# Patient Record
Sex: FEMALE
[De-identification: names, ages, dates, MRNs, and addresses within clinical notes are randomized per-mention and may not be internally consistent; named-entity substitution may affect disease eponyms.]

---

## 2017-05-19 ENCOUNTER — HOSPITAL ENCOUNTER (INPATIENT)
Dept: HOSPITAL 31 - C.ER | Age: 37
LOS: 1 days | Discharge: HOME | DRG: 361 | End: 2017-05-20
Attending: OBSTETRICS & GYNECOLOGY | Admitting: OBSTETRICS & GYNECOLOGY
Payer: COMMERCIAL

## 2017-05-19 DIAGNOSIS — Z90.710: ICD-10-CM

## 2017-05-19 DIAGNOSIS — Z90.721: ICD-10-CM

## 2017-05-19 DIAGNOSIS — N73.6: Primary | ICD-10-CM

## 2017-05-19 LAB
ALBUMIN/GLOB SERPL: 1.2 {RATIO} (ref 1–2.1)
ALP SERPL-CCNC: 64 U/L (ref 38–126)
ALT SERPL-CCNC: 28 U/L (ref 9–52)
APTT BLD: 32 SECONDS (ref 21–34)
AST SERPL-CCNC: 24 U/L (ref 14–36)
BACTERIA #/AREA URNS HPF: (no result) /[HPF]
BASE EXCESS BLDV CALC-SCNC: -4.6 MMOL/L (ref 0–2)
BASOPHILS # BLD AUTO: 0.1 K/UL (ref 0–0.2)
BASOPHILS NFR BLD: 0.6 % (ref 0–2)
BILIRUB SERPL-MCNC: 0.7 MG/DL (ref 0.2–1.3)
BILIRUB UR-MCNC: NEGATIVE MG/DL
BUN SERPL-MCNC: 8 MG/DL (ref 7–17)
CALCIUM SERPL-MCNC: 9 MG/DL (ref 8.6–10.4)
CHLORIDE SERPL-SCNC: 102 MMOL/L (ref 98–107)
CO2 SERPL-SCNC: 24 MMOL/L (ref 22–30)
EOSINOPHIL # BLD AUTO: 0.1 K/UL (ref 0–0.7)
EOSINOPHIL NFR BLD: 1.1 % (ref 0–4)
ERYTHROCYTE [DISTWIDTH] IN BLOOD BY AUTOMATED COUNT: 13.6 % (ref 11.5–14.5)
GLOBULIN SER-MCNC: 3.7 GM/DL (ref 2.2–3.9)
GLUCOSE SERPL-MCNC: 87 MG/DL (ref 65–105)
GLUCOSE UR STRIP-MCNC: NORMAL MG/DL
HCT VFR BLD CALC: 39.7 % (ref 34–47)
INR PPP: 1.1
KETONES UR STRIP-MCNC: NEGATIVE MG/DL
LEUKOCYTE ESTERASE UR-ACNC: (no result) LEU/UL
LYMPHOCYTES # BLD AUTO: 3.8 K/UL (ref 1–4.3)
LYMPHOCYTES NFR BLD AUTO: 29.9 % (ref 20–40)
MCH RBC QN AUTO: 29.5 PG (ref 27–31)
MCHC RBC AUTO-ENTMCNC: 33 G/DL (ref 33–37)
MCV RBC AUTO: 89.3 FL (ref 81–99)
MONOCYTES # BLD: 0.9 K/UL (ref 0–0.8)
MONOCYTES NFR BLD: 7.2 % (ref 0–10)
NRBC BLD AUTO-RTO: 0 % (ref 0–2)
PCO2 BLDV: 46 MMHG (ref 40–60)
PH BLDV: 7.29 [PH] (ref 7.32–7.43)
PH UR STRIP: 6 [PH] (ref 5–8)
PLATELET # BLD: 357 K/UL (ref 130–400)
PMV BLD AUTO: 8 FL (ref 7.2–11.7)
POTASSIUM SERPL-SCNC: 3.9 MMOL/L (ref 3.6–5.2)
PROT SERPL-MCNC: 8 G/DL (ref 6.3–8.3)
PROT UR STRIP-MCNC: NEGATIVE MG/DL
RBC # UR STRIP: NEGATIVE /UL
RBC #/AREA URNS HPF: < 1 /HPF (ref 0–3)
SODIUM SERPL-SCNC: 137 MMOL/L (ref 132–148)
SP GR UR STRIP: 1 (ref 1–1.03)
UROBILINOGEN UR-MCNC: NORMAL MG/DL (ref 0.2–1)
WBC # BLD AUTO: 12.7 K/UL (ref 4.8–10.8)
WBC #/AREA URNS HPF: 1 /HPF (ref 0–5)

## 2017-05-19 NOTE — US
EXAM:

  US Pelvis, Transvaginal



CLINICAL HISTORY:

  36 years old, female; Pain; Pelvic pain; Additional info: Left adenxal pain



TECHNIQUE:

  Real-time transvaginal pelvic ultrasound (complete) with image documentation. 

 Transvaginal imaging was used for better evaluation of the endometrium and 

adnexa.



EXAM DATE/TIME:

  5/19/2017 10:26 PM



COMPARISON:

  CT - ABD   PELVIS W/O PO OR IV CONT 5/19/2017 10:10:21 PM



FINDINGS:

  

Uterus: Uterus is surgically absent.



Right ovary: Right ovary measures approximately 21 2 x 1.4 x 1.9 cm.  There are 

small follicles.  There is flow in the right ovary.



Left ovary: Left ovary is enlarged, 6.35 x 4.28 x 5.93 cm.  There is a complex 

mass in the left ovary.  Mass measures approximately 5.2 x 3.9 x 5.3 cm.  There 

is a fluid component to the mass.  There is a larger avascular solid-appearing 

component.  There is flow in the periphery of the left ovary.



IMPRESSION: Enlarged left ovary with complex mass possibly hemorrhagic cyst 

with adjacent simple cyst, no torsion



Followup suggested to document resolution

## 2017-05-20 VITALS — HEART RATE: 69 BPM | SYSTOLIC BLOOD PRESSURE: 104 MMHG | RESPIRATION RATE: 20 BRPM | DIASTOLIC BLOOD PRESSURE: 69 MMHG

## 2017-05-20 VITALS — TEMPERATURE: 97.7 F | OXYGEN SATURATION: 96 %

## 2017-05-20 PROCEDURE — 0WJJ4ZZ INSPECTION OF PELVIC CAVITY, PERCUTANEOUS ENDOSCOPIC APPROACH: ICD-10-PCS | Performed by: OBSTETRICS & GYNECOLOGY

## 2017-05-20 NOTE — CP.SDSHP
Same Day Surgery H & P





- History


Proposed Procedure: Diagnostic Laparascopy with possible ovarian cystectomy or 

oophorectomy or open laparatomy


Pre-Op Diagnosis: Left Lower Quadrant pain.  Left Ovarian Torsion





- Allergies


Allergies: 


Allergies





No Known Allergies Allergy (Verified 05/19/17 21:41)


 











- Physical Exam


Vital Signs: 


 Vital Signs











  05/19/17 05/19/17 05/19/17





  21:37 22:35 23:45


 


Temperature 97.5 F L  98.0 F


 


Pulse Rate 80 80 81


 


Respiratory 20 18 16





Rate   


 


Blood Pressure 146/87 123/72 122/80


 


O2 Sat by Pulse 95 96 98





Oximetry   














  05/19/17





  23:47


 


Temperature 


 


Pulse Rate 


 


Respiratory 





Rate 


 


Blood Pressure 


 


O2 Sat by Pulse 95





Oximetry 











Mental Status: Alert & Oriented x3


Neuro: WNL


Heart: WNL


Lungs: WNL


GI: WNL





- {Optional Preform as Required}


Breast: WNL


Abdomen: Other (Tenderness with some amount of guarding on the left flank.)


GYN: Other (Enlarged left adnexal mass)


: WNL


ENT: WNL


Other Pertinent Findings: Enlarged left Adnexal mass





- Impression


Impression: Left Ovarian Torsion


Pt. Evaluated Today:Candidate for Anesthesia & Procedure: Yes





- Date & Time


Date: 05/20/17


Time: 00:23





Short Stay Discharge





- Short Stay Discharge


Admitting Diagnosis/Reason for Visit: ABDOMINAL PAIN


Disposition: HOSPITALIZED

## 2017-05-20 NOTE — PCM.SURG1
Surgeon's Initial Post Op Note





- Surgeon's Notes


Surgeon: Dr Sanz


Assistant: Dr Talley


Type of Anesthesia: General Endo


Anesthesia Administered By: Dr Hicks


Pre-Operative Diagnosis: Abdominal Pain.  Left Ovarian torsion


Operative Findings: Extensive intrabdominal adhesions involving the omentun and 

bowels to the anterior abdominal wall and to the pelvis. There was no evidence 

of ovarian torsion. The left ovary involved with adhesions to the omentum and 

the bowels. The bowels appears normal. The uterus was absent. Rt ovary not 

visualized.  IV Fluid intake.- 600mls.  Urine Output- 50mls.  EBL - 10mls


Post-Operative Diagnosis: Pelvic adhesions


Operation Performed: Diagnostic Laparascopy


Specimen/Specimens Removed: None


Estimated Blood Loss: EBL {In ML}: 10


Blood Products Given: N/A


Post-Op Condition: Good


Date of Surgery/Procedure: 05/20/17


Time of Surgery/Procedure: 03:01

## 2017-05-20 NOTE — CT
PROCEDURE:  CT Abdomen and Pelvis without intravenous contrast



HISTORY:

Abdominal pain 



COMPARISON:

None.



TECHNIQUE:

Axial computed tomographic images were performed through the abdomen 

and pelvis without the use of intravenous contrast.  Subsequently, 

sagittal and coronal reformatted images were obtained. 



Radiation dose:



Total exam DLP = 341 mGy-cm.



This CT exam was performed using one or more of the following dose 

reduction techniques: Automated exposure control, adjustment of the 

mA and/or kV according to patient size, and/or use of iterative 

reconstruction technique.



FINDINGS:



LOWER THORAX:

Unremarkable. 



LIVER:

Unremarkable. No gross lesion or ductal dilatation.  



GALLBLADDER AND BILE DUCTS:

Unremarkable. 



PANCREAS:

Unremarkable. No gross lesion or ductal dilatation.



SPLEEN:

Unremarkable. 



ADRENALS:

Unremarkable. No mass. 



KIDNEYS AND URETERS:

Moderate left hydroureteronephrosis secondary to a left adnexal 

cystic mass which measures 5.4 centimeters.  Moderate left 

hydroureter seen. 



VASCULATURE:

Unremarkable. No aortic aneurysm. 



BOWEL:

Unremarkable. No obstruction. No gross mural thickening. 



APPENDIX:

Unremarkable. Normal appendix. 



PERITONEUM:

Unremarkable. No free fluid. No free air. 



LYMPH NODES:

Unremarkable. No enlarged lymph nodes. 



BLADDER:

Decompressed urinary bladder wall. 



REPRODUCTIVE:

Retroverted uterus. Complex mass at the level of the left adnexa, 

possibly related to the left ovary measuring 5.4 x 4.1 x 6.1 

centimeters. 



BONES:

No acute fracture. 



OTHER FINDINGS:

None.



IMPRESSION:

Moderate left hydronephrosis and hydroureter secondary to a left 

adnexal mass which appears to cause mass effect on the distal left 

ureter.



Differential diagnosis of the left adnexal mass may include a 

hemorrhagic cyst versus endometrioma versus ovarian torsion versus 

neoplasm versus additional etiology.  Clinical correlation. Consider 

transvaginal sonographic correlation with Doppler. 



These findings were preliminarily reported at 10:23 p.m. on 

05/19/2017 by Dr. Curt Bhakta .

## 2017-05-20 NOTE — RAD
Chest x-ray single frontal view 



History: Preoperative evaluation. 



Comparison: 04/24/2017 



Findings: 



Persistent ill-defined areas of increased radiodensity seen within 

the right upper to mid lung zone as well as the left mid lung which 

may represent chronic scarring. Additional etiologies not excluded. 

This is not significantly changed since the prior study. 



Mild venous congestion. 



Heart size within normal limits. 



Impression: 



No significant interval change.

## 2017-05-20 NOTE — CP.PCM.DIS
Provider





- Provider


Date of Admission: 


05/19/17 23:47





Attending physician: 


Pop Sanz





Time Spent in preparation of Discharge (in minutes): 15





Diagnosis





- Discharge Diagnosis


(1) Left lower quadrant pain


Status: Acute   Priority: Low   Onset Date: ~05/19/17   


Comment: Status post diagnostic laparoscopy. Significant abdominal adhesions 

noted. No evidence of ovarian torsion. No other additional procedures performed

   





(2) Pelvic adhesive disease


Status: Chronic   Priority: Medium   Onset Date: ~05/19/17   


Comment: Status post diagnostic laparoscopy: significant for abdominal 

adhesions. Right ovary not seen. Left ovary covered with adhesions. No 

additional procedure(s) performed.   





Hospital Course





- Lab Results


Lab Results: 


 Most Recent Lab Values











WBC  12.7 K/uL (4.8-10.8)  H  05/19/17  21:54    


 


RBC  4.44 Mil/uL (3.80-5.20)   05/19/17  21:54    


 


Hgb  13.1 g/dL (11.0-16.0)   05/19/17  21:54    


 


Hct  39.7 % (34.0-47.0)   05/19/17  21:54    


 


MCV  89.3 fL (81.0-99.0)   05/19/17  21:54    


 


MCH  29.5 pg (27.0-31.0)   05/19/17  21:54    


 


MCHC  33.0 g/dL (33.0-37.0)   05/19/17  21:54    


 


RDW  13.6 % (11.5-14.5)   05/19/17  21:54    


 


Plt Count  357 K/uL (130-400)   05/19/17  21:54    


 


MPV  8.0 fL (7.2-11.7)   05/19/17  21:54    


 


Neut % (Auto)  61.2 % (50.0-75.0)   05/19/17  21:54    


 


Lymph % (Auto)  29.9 % (20.0-40.0)   05/19/17  21:54    


 


Mono % (Auto)  7.2 % (0.0-10.0)   05/19/17  21:54    


 


Eos % (Auto)  1.1 % (0.0-4.0)   05/19/17  21:54    


 


Baso % (Auto)  0.6 % (0.0-2.0)   05/19/17  21:54    


 


Neut #  7.8 K/uL (1.8-7.0)  H  05/19/17  21:54    


 


Lymph #  3.8 K/uL (1.0-4.3)   05/19/17  21:54    


 


Mono #  0.9 K/uL (0.0-0.8)  H  05/19/17  21:54    


 


Eos #  0.1 K/uL (0.0-0.7)   05/19/17  21:54    


 


Baso #  0.1 K/uL (0.0-0.2)   05/19/17  21:54    


 


PT  12.0 SECONDS (9.7-12.2)   05/19/17  21:54    


 


INR  1.1   05/19/17  21:54    


 


APTT  32 SECONDS (21-34)   05/19/17  21:54    


 


pO2  40 mm/Hg (30-55)   05/19/17  23:48    


 


VBG pH  7.29  (7.32-7.43)  L  05/19/17  23:48    


 


VBG pCO2  46 mmHg (40-60)   05/19/17  23:48    


 


VBG HCO3  20.5 mmol/L  05/19/17  23:48    


 


VBG Total CO2  23.5 mmol/L (22-28)   05/19/17  23:48    


 


VBG O2 Sat (Calc)  77.3 % (40-65)  H  05/19/17  23:48    


 


VBG Base Excess  -4.6 mmol/L (0.0-2.0)  L  05/19/17  23:48    


 


VBG Potassium  5.5 mmol/L (3.6-5.2)  H  05/19/17  23:48    


 


Sodium  139.0 mmol/l (132-148)   05/19/17  23:48    


 


Chloride  110.0 mmol/L ()  H  05/19/17  23:48    


 


Glucose  82 mg/dl ()   05/19/17  23:48    


 


Lactate  1.0 mmol/L (0.7-2.1)   05/19/17  23:48    


 


Sodium  137 mmol/L (132-148)   05/19/17  21:54    


 


Potassium  3.9 mmol/L (3.6-5.2)   05/19/17  21:54    


 


Chloride  102 mmol/L ()   05/19/17  21:54    


 


Carbon Dioxide  24 mmol/L (22-30)   05/19/17  21:54    


 


Anion Gap  16  (10-20)   05/19/17  21:54    


 


BUN  8 mg/dL (7-17)   05/19/17  21:54    


 


Creatinine  0.7 MG/DL (0.7-1.2)   05/19/17  21:54    


 


Est GFR ( Amer)  > 60   05/19/17  21:54    


 


Est GFR (Non-Af Amer)  > 60   05/19/17  21:54    


 


Random Glucose  87 mg/dL ()   05/19/17  21:54    


 


Calcium  9.0 mg/dl (8.6-10.4)   05/19/17  21:54    


 


Total Bilirubin  0.7 mg/dL (0.2-1.3)   05/19/17  21:54    


 


AST  24 U/L (14-36)   05/19/17  21:54    


 


ALT  28 U/L (9-52)   05/19/17  21:54    


 


Alkaline Phosphatase  64 U/L ()   05/19/17  21:54    


 


Total Protein  8.0 g/dL (6.3-8.3)   05/19/17  21:54    


 


Albumin  4.4 g/dL (3.5-5.0)   05/19/17  21:54    


 


Globulin  3.7 gm/dL (2.2-3.9)   05/19/17  21:54    


 


Albumin/Globulin Ratio  1.2  (1.0-2.1)   05/19/17  21:54    


 


Lipase  51 U/L ()   05/19/17  21:54    


 


Venous Blood Potassium  5.5 mmol/L (3.6-5.2)  H  05/19/17  23:48    


 


Urine Color  Straw  (YELLOW)   05/19/17  23:20    


 


Urine Clarity  Hazy  (Clear)   05/19/17  23:20    


 


Urine pH  6.0  (5.0-8.0)   05/19/17  23:20    


 


Ur Specific Gravity  1.002  (1.003-1.030)  L  05/19/17  23:20    


 


Urine Protein  Negative mg/dL (NEGATIVE)   05/19/17  23:20    


 


Urine Glucose (UA)  Normal mg/dL (Normal)   05/19/17  23:20    


 


Urine Ketones  Negative mg/dL (NEGATIVE)   05/19/17  23:20    


 


Urine Blood  Negative  (NEGATIVE)   05/19/17  23:20    


 


Urine Nitrate  Negative  (NEGATIVE)   05/19/17  23:20    


 


Urine Bilirubin  Negative  (NEGATIVE)   05/19/17  23:20    


 


Urine Urobilinogen  Normal mg/dL (0.2-1.0)   05/19/17  23:20    


 


Ur Leukocyte Esterase  Neg Ermelinda/uL (Negative)   05/19/17  23:20    


 


Urine WBC (Auto)  1 /hpf (0-5)   05/19/17  23:20    


 


Urine RBC (Auto)  < 1 /hpf (0-3)   05/19/17  23:20    


 


Ur Squamous Epith Cells  3 /hpf (0-5)   05/19/17  23:20    


 


Urine Bacteria  Rare  (<OCC)   05/19/17  23:20    


 


Urine HCG, Qual  Negative  (NEGATIVE)   05/19/17  23:20    


 


Blood Type  O POSITIVE   05/20/17  00:32    


 


Antibody Screen  Negative   05/20/17  00:32    














- Hospital Course


Hospital Course: 





Patient admitted with acute onset of left lower quadrant abdominal pain and 

imaging studies suggesting left ovarian cyst and possible ovarian torsion. 

Patient underwent diagnostic laparoscopy with findings of extensive abdominal 

adhesions. Uterus not seen. Right ovary not seen. Left ovary covered by 

adhesions. Procedure ended at this stage.


POD#1 Tolerating p.o. Ambulating and voided without difficulty. Surgery 

performed was discussed.  [employer]  used I.D.# [id number].





- Date & Time of H&P


Date of H&P: 05/19/17


Time of H&P: 19:00





Discharge Exam





- Head Exam


Head Exam: NORMAL INSPECTION





- Eye Exam


Eye Exam: Normal appearance





- ENT Exam


ENT Exam: Mucous Membranes Moist





- Neck Exam


Neck exam: Full Rom





- Respiratory Exam


Respiratory Exam: NORMAL BREATHING PATTERN





- Cardiovascular Exam


Cardiovascular Exam: REGULAR RHYTHM





- GI/Abdominal Exam


GI & Abdominal Exam: Normal Bowel Sounds


Additional comments: 





Healed midline vertical scar from sternum to symphysis. Clean and dry LUQ and 

suprapubic laparoscopi sites  (+) ABS. Minimal tenderness in LLQ to palpation. 

No rebound No guarding.





- Extremities Exam


Extremities exam: full ROM, normal inspection





- Back Exam


Back exam: NORMAL INSPECTION





- Neurological Exam


Neurological exam: Alert, Oriented x3





- Psychiatric Exam


Psychiatric exam: Normal Affect, Normal Mood





- Skin


Skin Exam: Dry, Intact, Normal Color, Warm





Discharge Plan





- Discharge Medications


Prescriptions: 


Ibuprofen [Motrin] 600 mg PO Q6 PRN #24 tab


 PRN Reason: Pain, Mild (1-3)


oxyCODONE/Acetaminophen [Percocet 5/325 mg Tab] 1 tab PO Q4 PRN #12 tab


 PRN Reason: Pain, Moderate (4-7)





- Follow Up Plan


Condition: STABLE


Disposition: HOME/ ROUTINE


Instructions:  Ovarian Cyst (DC)


Additional Instructions: 


No heavy  lifting x2Weeks.   F/up [facility] 5/24/17,    Dr Sanz


Nothing per vagina x 2weeks


Referrals: 


Pop Sanz [Staff Provider] - 





Addendum


Addendum: 





05/20/17 16:41


Please note: at time of laparoscopy, left ovarian cyst was NOT confirmed.

## 2017-05-22 NOTE — CARD
--------------- APPROVED REPORT --------------





EKG Measurement

Heart [id number]

[id number] [id number]

[id number]

[id number]

[id number]



<Conclusion>

Normal sinus rhythm

Normal ECG

## 2017-05-23 NOTE — OP
PROCEDURE DATE: 05/20/2017



PREOPERATIVE DIAGNOSIS:  Abdominal pain, possibly due to ovarian torsion.



POSTOPERATIVE DIAGNOSIS:  Abdominal pain, abdominal and pelvic adhesions.



PROCEDURE DONE:  Diagnostic laparoscopy performed on 05/20/2017.



SURGEON:  Dr. Sanz.



ASSISTANT:  Dr. Talley.  Assistance to this procedure was needed for exposure of 
tissues and also help in the conduct of the surgery.  The assistant remained 
with the surgery throughout its entire length.



TYPE OF ANESTHESIA:  General endotracheal.



ANESTHESIOLOGIST:  Anesthesia administered by Dr. Rios.



FINDINGS:  Extensive intra-abdominal adhesions involving the omentum and the 
bowels to the anterior abdominal walls and to the pelvis.  There were bowel 
adhesions to the pelvic sidewall on the left side and omental adhesions in the 
area around the umbilicus and the right lower quadrant.  There were dense 
adhesions involving the pelvic tissues and there were no apparent reproductive 
organs.  The uterus was absent.  The left ovary was seen and was involved in 
dense adhesions to the omentum and the bowels.  There were no signs of ovarian 
torsion of abdominal organ.  The right ovary was not visualized due to 
adhesions on that side.



INTRAVENOUS FLUID INTAKE:  600 mL.



URINE OUTPUT:  50 mL.



ESTIMATED BLOOD LOSS:  50 mL.



SPECIMENS REMOVED:  None.



DESCRIPTION OF PROCEDURE:  After obtaining informed consent, the patient was 
sent to the OR with IV running and Maciel catheter in place.  The patient was 
put in a supine position on the OR table and after adequate general anesthesia, 
was placed in the dorsal lithotomy position.  A sponge forceps with gauze 
clamped at the tip was inserted into the vagina to help elevate the vaginal 
vault.  Attention was then turned onto the anterior abdominal wall where a 5 mm 
incision was made in the left hypochondriac region about 10 cm from the costal 
on the left side.  A 5 mm trocar and sleeve was placed in this port after 
filling the abdomen with a carbon dioxide gas to a pressure of 14mmHg.  Once 
intra-abdominal placement had been done, a laparoscope was introduced into the 
abdominal cavity with the above findings.  The left lower quadrants of the 
anterior abdominal wall had dense adhesions of omentum and the bowels.  Another 
5 mm port was placed in the right lower quadrant where a laparoscopic probe was 
introduced to help expose the tissues and inspect the organs.  There was no 
evidence of ovarian torsion and after obtaining enough pictures, the procedure 
was completed.  Other intra-abdominal organs were inspected and found to be 
unremarkable, but there was extensive adhesions intra-abdominally.  The carbon 
dioxide gas was let out of the abdominal cavity and both trocar and sleeves 
were taken out of the abdominal cavity and the 5 mm anterior abdominal ports 
were closed using #4-0 Biosyn.  All counts of instruments used were correct.  
The patient was replaced in a supine position and was sent to the recovery room 
awake and in stable condition.  The patient tolerated the procedure well.





__________________________________________

Pop Sanz MD







cc:   



DD: 05/23/2017 17:17:57  [id number]

TT: 05/23/2017 22:59:16

Job # [id number]

thais ALONSO

## 2017-09-02 ENCOUNTER — HOSPITAL ENCOUNTER (EMERGENCY)
Dept: HOSPITAL 31 - C.ER | Age: 37
Discharge: HOME | End: 2017-09-02
Payer: SELF-PAY

## 2017-09-02 VITALS
HEART RATE: 84 BPM | DIASTOLIC BLOOD PRESSURE: 97 MMHG | TEMPERATURE: 98.2 F | RESPIRATION RATE: 18 BRPM | SYSTOLIC BLOOD PRESSURE: 139 MMHG

## 2017-09-02 VITALS — OXYGEN SATURATION: 99 %

## 2017-09-02 DIAGNOSIS — R07.89: Primary | ICD-10-CM

## 2017-09-02 LAB
ALBUMIN/GLOB SERPL: 1.1 {RATIO} (ref 1–2.1)
ALP SERPL-CCNC: 63 U/L (ref 38–126)
ALT SERPL-CCNC: 28 U/L (ref 9–52)
AST SERPL-CCNC: 21 U/L (ref 14–36)
BASOPHILS # BLD AUTO: 0 K/UL (ref 0–0.2)
BASOPHILS NFR BLD: 0.5 % (ref 0–2)
BILIRUB SERPL-MCNC: 0.5 MG/DL (ref 0.2–1.3)
BUN SERPL-MCNC: 8 MG/DL (ref 7–17)
CALCIUM SERPL-MCNC: 9 MG/DL (ref 8.6–10.4)
CHLORIDE SERPL-SCNC: 108 MMOL/L (ref 98–107)
CO2 SERPL-SCNC: 20 MMOL/L (ref 22–30)
EOSINOPHIL # BLD AUTO: 0.1 K/UL (ref 0–0.7)
EOSINOPHIL NFR BLD: 1.3 % (ref 0–4)
ERYTHROCYTE [DISTWIDTH] IN BLOOD BY AUTOMATED COUNT: 13.2 % (ref 11.5–14.5)
GLOBULIN SER-MCNC: 3.4 GM/DL (ref 2.2–3.9)
GLUCOSE SERPL-MCNC: 106 MG/DL (ref 65–105)
HCT VFR BLD CALC: 38.6 % (ref 34–47)
LYMPHOCYTES # BLD AUTO: 2.4 K/UL (ref 1–4.3)
LYMPHOCYTES NFR BLD AUTO: 27.9 % (ref 20–40)
MCH RBC QN AUTO: 30.1 PG (ref 27–31)
MCHC RBC AUTO-ENTMCNC: 33.9 G/DL (ref 33–37)
MCV RBC AUTO: 88.9 FL (ref 81–99)
MONOCYTES # BLD: 0.8 K/UL (ref 0–0.8)
MONOCYTES NFR BLD: 9.6 % (ref 0–10)
NRBC BLD AUTO-RTO: 0 % (ref 0–2)
PLATELET # BLD: 319 K/UL (ref 130–400)
PMV BLD AUTO: 8.2 FL (ref 7.2–11.7)
POTASSIUM SERPL-SCNC: 3.9 MMOL/L (ref 3.6–5.2)
PROT SERPL-MCNC: 7.2 G/DL (ref 6.3–8.3)
SODIUM SERPL-SCNC: 142 MMOL/L (ref 132–148)
WBC # BLD AUTO: 8.7 K/UL (ref 4.8–10.8)

## 2017-09-02 PROCEDURE — 84703 CHORIONIC GONADOTROPIN ASSAY: CPT

## 2017-09-02 PROCEDURE — 85025 COMPLETE CBC W/AUTO DIFF WBC: CPT

## 2017-09-02 PROCEDURE — 84484 ASSAY OF TROPONIN QUANT: CPT

## 2017-09-02 PROCEDURE — 80053 COMPREHEN METABOLIC PANEL: CPT

## 2017-09-02 PROCEDURE — 96374 THER/PROPH/DIAG INJ IV PUSH: CPT

## 2017-09-02 PROCEDURE — 71020: CPT

## 2017-09-02 PROCEDURE — 85378 FIBRIN DEGRADE SEMIQUANT: CPT

## 2017-09-02 PROCEDURE — 99284 EMERGENCY DEPT VISIT MOD MDM: CPT

## 2017-09-02 NOTE — RAD
HISTORY:

chest pain  



COMPARISON:

Chest CT 09/25/2014 and more recent prior chest radiograph 05/20/2017.



TECHNIQUE:

Chest PA and lateral



FINDINGS:



LUNGS:

No active pulmonary disease. Chronic fibrotic changes are mild at the 

right apex and mid left lung laterally, stable in the interval. 

Associated granulomatous changes are also noted in the same 

distributions.



PLEURA:

No significant pleural effusion identified. No pneumothorax apparent.



CARDIOVASCULAR:

Normal.



OSSEOUS STRUCTURES:

No significant abnormalities.



VISUALIZED UPPER ABDOMEN:

Normal.



OTHER FINDINGS:

None.



IMPRESSION:

No acute cardiopulmonary disease identified.  Chronic fibrotic 

changes are seen as well as granulomatous disease on a limited basis 

as discussed above bilaterally. No significant interval change 

appreciable.

## 2017-09-02 NOTE — C.PDOC
History Of Present Illness


36 year old female who presents to the ER with a complaint of chest pain and 

palpitations that began a half hour ago that worsens with deep inspiration and 

movement. Denies cough or lightheadedness.


Chief Complaint (Nursing): Chest Pain


History Per: Patient


History/Exam Limitations: no limitations


Onset/Duration Of Symptoms: Mins


Current Symptoms Are (Timing): Still Present


Associated Symptoms: denies: Nausea, Dyspnea, Diaphoresis, Syncope


Modifying Factors: None


Exacerbating Factors: Movement, Deep Breathing


Alleviating Factors: None


Recent travel outside of the [place]: No





Past Medical History


Reviewed: Historical Data, Nursing Documentation, Vital Signs


Vital Signs: 


 Last Vital Signs











Temp  97.8 F   17 00:55


 


Pulse  87   17 00:55


 


Resp  14   17 00:55


 


BP  149/97 H  17 00:55


 


Pulse Ox  99   17 01:44














- Medical History


PMH: No Chronic Diseases





- [facility] Procedures








CYSTOSCOPY NEC (09/10/14)


INSPECTION OF PELVIC CAVITY, PERC ENDO APPROACH (17)


OTH LYSIS-PERITONEAL ADHES (09/10/14)


OTH REMOVE BOTH OVARIES/TUBES (09/10/14)


OTHER AND UNSPECIFIED TOTAL ABDOMINAL HYSTERECTOMY (09/10/14)


REMOV URETERAL DRAIN (09/10/14)


RETROGRADE PYELOGRAM (09/10/14)


URETERAL CATHETERIZATION (09/10/14)








Family History: States: No Known Family Hx





- Social History


Hx Alcohol Use: No


Hx Substance Use: No





- Immunization History


Hx Tetanus Toxoid Vaccination: No


Hx Influenza Vaccination: No


Hx Pneumococcal Vaccination: No





Review Of Systems


Constitutional: Negative for: Fever, Chills


Cardiovascular: Positive for: Chest Pain, Palpitations.  Negative for: Light 

Headedness


Respiratory: Negative for: Cough, Shortness of Breath


Gastrointestinal: Negative for: Nausea, Vomiting





Physical Exam





- Physical Exam


Appears: Non-toxic, No Acute Distress


Skin: Normal Color, Warm, Dry


Head: Atraumatic, Normacephalic


Oral Mucosa: Moist


Throat: Normal, No Erythema


Chest: Symmetrical, Tenderness (Mild left anterior wall area)


Cardiovascular: Rhythm Regular, No Murmur


Respiratory: Normal Breath Sounds, No Rales, No Rhonchi, No Wheezing


Gastrointestinal/Abdominal: Soft, No Tenderness


Neurological/Psych: Oriented x3, Normal Speech, Normal Cognition





ED Course And Treatment





- Laboratory Results


Result Diagrams: 


 17 01:17





 17 01:17


ECG: Interpreted By Me, Viewed By Me


ECG Rhythm: Sinus Rhythm


ECG Interpretation: Normal, No Acute Changes


Interpretation Of ECG: NSR, normal tracings.


Rate From EC


O2 Sat by Pulse Oximetry: 99


Pulse Ox Interpretation: Normal





- Radiology


CXR: Interpreted by Me, Viewed By Me


CXR Interpretation: Yes: No Acute Disease, Other (normal chest film).  No: 

Infiltrates


Progress Note: EKG, blood work, CXR, and urinalysis ordered. Toradol 

administered.





Disposition


Counseled Patient/Family Regarding: Diagnosis





- Disposition


Referrals: 


[facility] [place] [Outside]


Disposition: HOME/ ROUTINE


Disposition Time: :09


Condition: STABLE


Prescriptions: 


Naproxen [Naprosyn Tab] 375 mg PO TIDPC #20 tab


Instructions:  Chest Wall Pain (ED)


Forms:  [facility] (English)





- POA


Present On Arrival: None





- Clinical Impression


Clinical Impression: 


 Chest wall pain








- Scribe Statement


The provider has reviewed the documentation as recorded by the Scribbonita Morel





All medical record entries made by the Scribe were at my direction and 

personally dictated by me. I have reviewed the chart and agree that the record 

accurately reflects my personal performance of the history, physical exam, 

medical decision making, and the department course for this patient. I have 

also personally directed, reviewed, and agree with the discharge instructions 

and disposition.

## 2017-09-15 NOTE — CARD
--------------- APPROVED REPORT --------------





EKG Measurement

Heart [id number]

HI [id number]

[id number]

[id number]

[id number]



<Conclusion>

Normal sinus rhythm

Normal ECG

## 2018-01-31 ENCOUNTER — HOSPITAL ENCOUNTER (EMERGENCY)
Dept: HOSPITAL 31 - C.ER | Age: 38
Discharge: HOME | End: 2018-01-31
Payer: SELF-PAY

## 2018-01-31 VITALS
TEMPERATURE: 98.7 F | DIASTOLIC BLOOD PRESSURE: 71 MMHG | HEART RATE: 71 BPM | RESPIRATION RATE: 18 BRPM | SYSTOLIC BLOOD PRESSURE: 105 MMHG

## 2018-01-31 VITALS — OXYGEN SATURATION: 100 %

## 2018-01-31 DIAGNOSIS — R07.89: Primary | ICD-10-CM

## 2018-01-31 LAB
ALBUMIN SERPL-MCNC: 4.4 G/DL (ref 3.5–5)
ALBUMIN/GLOB SERPL: 1.3 {RATIO} (ref 1–2.1)
ALT SERPL-CCNC: 21 U/L (ref 9–52)
AST SERPL-CCNC: 23 U/L (ref 14–36)
BACTERIA #/AREA URNS HPF: (no result) /[HPF]
BASOPHILS # BLD AUTO: 0.1 K/UL (ref 0–0.2)
BASOPHILS NFR BLD: 0.8 % (ref 0–2)
BILIRUB UR-MCNC: NEGATIVE MG/DL
BUN SERPL-MCNC: 13 MG/DL (ref 7–17)
CALCIUM SERPL-MCNC: 9.6 MG/DL (ref 8.6–10.4)
COLOR UR: (no result)
EOSINOPHIL # BLD AUTO: 0.1 K/UL (ref 0–0.7)
EOSINOPHIL NFR BLD: 1.4 % (ref 0–4)
ERYTHROCYTE [DISTWIDTH] IN BLOOD BY AUTOMATED COUNT: 12.9 % (ref 11.5–14.5)
GFR NON-AFRICAN AMERICAN: > 60
GLUCOSE UR STRIP-MCNC: NEGATIVE MG/DL
HCG,QUALITATIVE URINE: NEGATIVE
HGB BLD-MCNC: 14.7 G/DL (ref 11–16)
LEUKOCYTE ESTERASE UR-ACNC: NEGATIVE LEU/UL
LYMPHOCYTES # BLD AUTO: 4.1 K/UL (ref 1–4.3)
LYMPHOCYTES NFR BLD AUTO: 37.6 % (ref 20–40)
MCH RBC QN AUTO: 31.4 PG (ref 27–31)
MCHC RBC AUTO-ENTMCNC: 35.1 G/DL (ref 33–37)
MCV RBC AUTO: 89.5 FL (ref 81–99)
MONOCYTES # BLD: 0.8 K/UL (ref 0–0.8)
MONOCYTES NFR BLD: 6.9 % (ref 0–10)
NEUTROPHILS # BLD: 5.9 K/UL (ref 1.8–7)
NEUTROPHILS NFR BLD AUTO: 53.3 % (ref 50–75)
NRBC BLD AUTO-RTO: 0 % (ref 0–2)
PH UR STRIP: 6 [PH] (ref 5–8)
PLATELET # BLD: 377 K/UL (ref 130–400)
PMV BLD AUTO: 8.4 FL (ref 7.2–11.7)
PROT UR STRIP-MCNC: NEGATIVE MG/DL
RBC # BLD AUTO: 4.67 MIL/UL (ref 3.8–5.2)
RBC # UR STRIP: NEGATIVE /UL
SP GR UR STRIP: < 1.005 (ref 1–1.03)
SQUAMOUS EPITHIAL: 4 /HPF (ref 0–5)
URINE CLARITY: CLEAR
URINE NITRATE: NEGATIVE
UROBILINOGEN UR-MCNC: 0.2 MG/DL (ref 0.2–1)
WBC # BLD AUTO: 11 K/UL (ref 4.8–10.8)

## 2018-01-31 PROCEDURE — 85378 FIBRIN DEGRADE SEMIQUANT: CPT

## 2018-01-31 PROCEDURE — 96374 THER/PROPH/DIAG INJ IV PUSH: CPT

## 2018-01-31 PROCEDURE — 71045 X-RAY EXAM CHEST 1 VIEW: CPT

## 2018-01-31 PROCEDURE — 87804 INFLUENZA ASSAY W/OPTIC: CPT

## 2018-01-31 PROCEDURE — 80053 COMPREHEN METABOLIC PANEL: CPT

## 2018-01-31 PROCEDURE — 85025 COMPLETE CBC W/AUTO DIFF WBC: CPT

## 2018-01-31 PROCEDURE — 81001 URINALYSIS AUTO W/SCOPE: CPT

## 2018-01-31 PROCEDURE — 84484 ASSAY OF TROPONIN QUANT: CPT

## 2018-01-31 PROCEDURE — 94640 AIRWAY INHALATION TREATMENT: CPT

## 2018-01-31 PROCEDURE — 99285 EMERGENCY DEPT VISIT HI MDM: CPT

## 2018-01-31 PROCEDURE — 84703 CHORIONIC GONADOTROPIN ASSAY: CPT

## 2018-01-31 PROCEDURE — 93005 ELECTROCARDIOGRAM TRACING: CPT

## 2018-01-31 PROCEDURE — 96375 TX/PRO/DX INJ NEW DRUG ADDON: CPT

## 2018-01-31 NOTE — C.PDOC
History Of Present Illness





Stanley Cox is a 37 year old female, with no significant past medical history

, who presents to the emergency department complaining of a sudden left sided 

chest pain associated with cough onset today at 4pm. Patient describes the pain 

as constant, worst with deep breaths and movement. Patient denies similar 

symptoms in the past. She denies any fever, chills, shortness of breath, recent 

travels, leg pain or swelling. No further medical complaints.





PMD: None provided. 


Time Seen by Provider: 01/31/18 19:31


Chief Complaint (Nursing): Chest Pain


History Per: Patient


History/Exam Limitations: no limitations


Onset/Duration Of Symptoms: Hrs (today), Sudden Onset


Current Symptoms Are (Timing): Still Present


Quality: "Pain"


Associated Symptoms: Other (cough).  denies: Dyspnea


Exacerbating Factors: Movement, Deep Breathing





Past Medical History


Reviewed: Historical Data, Nursing Documentation, Vital Signs


Vital Signs: 


 Last Vital Signs











Temp  98.7 F   01/31/18 22:42


 


Pulse  71   01/31/18 22:42


 


Resp  18   01/31/18 22:42


 


BP  105/71   01/31/18 22:42


 


Pulse Ox  99   01/31/18 22:42














- Medical History


PMH: No Chronic Diseases


   Denies: Chronic Kidney Disease


Surgical History: No Surg Hx





- [facility] Procedures








CYSTOSCOPY NEC (09/10/14)


INSPECTION OF PELVIC CAVITY, PERC ENDO APPROACH (05/19/17)


OTH LYSIS-PERITONEAL ADHES (09/10/14)


OTH REMOVE BOTH OVARIES/TUBES (09/10/14)


OTHER AND UNSPECIFIED TOTAL ABDOMINAL HYSTERECTOMY (09/10/14)


REMOV URETERAL DRAIN (09/10/14)


RETROGRADE PYELOGRAM (09/10/14)


URETERAL CATHETERIZATION (09/10/14)








Family History: States: Unknown Family Hx


   Denies: MI





- Social History


Hx Tobacco Use: No


Hx Alcohol Use: No


Hx Substance Use: No





- Immunization History


Hx Tetanus Toxoid Vaccination: No


Hx Influenza Vaccination: No


Hx Pneumococcal Vaccination: No





Review Of Systems


Constitutional: Negative for: Fever, Chills


Cardiovascular: Positive for: Chest Pain (left sided constant)


Respiratory: Positive for: Cough.  Negative for: Shortness of Breath


Musculoskeletal: Negative for: Leg Pain (or swelling)





Physical Exam





- Physical Exam


Appears: Other (uncomfortable)


Skin: Warm, Dry


Head: Atraumatic, Normacephalic


Eye(s): bilateral: Normal Inspection, PERRL, EOMI


Ear(s): Bilateral: Normal


Nose: Normal


Oral Mucosa: Moist


Throat: Normal


Neck: Normal ROM, Supple


Chest: Tenderness (chest wall on palpation to anterior along left lateral chest

, and over the left pectoralis major.)


Cardiovascular: Rhythm Regular


Respiratory: Normal Breath Sounds (clear b/l), No Wheezing


Gastrointestinal/Abdominal: Normal Exam, Soft, No Tenderness


Back: Normal Inspection, No CVA Tenderness, No Vertebral Tenderness


Extremity: Normal ROM, No Calf Tenderness, No Deformity, No Swelling


Neurological/Psych: Oriented x3 (alert)





ED Course And Treatment





- Laboratory Results


Result Diagrams: 


 01/31/18 19:45





 01/31/18 19:45


Lab Interpretation: No Acute Changes


ECG: Interpreted By Me


ECG Rhythm: Sinus Rhythm


ECG Interpretation: No Acute Changes


O2 Sat by Pulse Oximetry: 100 (RA)


Pulse Ox Interpretation: Normal





- Radiology


CXR: Interpreted by Me


CXR Interpretation: Yes: No Acute Disease, Other (chronic granulomatous disease 

unchanged from prior CXR)


Reevaluation Time: 22:58


Reassessment Condition: Improved (after IV Toradol.)





Medical Decision Making


Medical Decision Making: 





Initial Impression:Chest Pain





Initial Plan:





--EKG


--CMP


--Troponin I


--CBC w/ differential


--D Dimer


--Chest one view [RAD]


--Morphine 2mg IVP


--HCG, Qualitative Urine


--Urinalysis


--reevaluation





Disposition


Counseled Patient/Family Regarding: Studies Performed, Diagnosis, Need For 

Followup, Rx Given





- Disposition


Referrals: 


Rodney Garcia MD [Medical Doctor] - 


Disposition: HOME/ ROUTINE


Disposition Time: 22:59


Condition: IMPROVED


Prescriptions: 


Naproxen [Naprosyn] 1 tab PO BID PRN #25 tab


 PRN Reason: Pain


Instructions:  Chest Wall Pain (ED)


Forms:  [facility] (English)





- Clinical Impression


Clinical Impression: 


 Chest wall pain








- Scribe Statement





Ady Padilla


Provider Attestation: 








All medical record entries made by the Scribe were at my direction and 

personally dictated by me. I have reviewed the chart and agree that the record 

accurately reflects my personal performance of the history, physical exam, 

medical decision making, and the department course for this patient. I have 

also personally directed, reviewed, and agree with the discharge instructions 

and disposition.

## 2018-02-01 NOTE — RAD
Chest x-ray single frontal view 



History: Chest pain. 



Comparison: 09/02/2017 



Findings: 



Mild venous congestion. 



Probable chronic fibrotic changes and or granulomatous change is seen 

in the upper to mid lung zones bilaterally. 



Heart size within normal limits. 



Tortuous aorta. 



Degenerative changes in the spine. 



Impression: 



Mild venous congestion. 



Probable chronic fibrotic changes and or granulomatous change is seen 

in the upper to mid lung zones bilaterally.

## 2018-02-01 NOTE — CARD
--------------- APPROVED REPORT --------------





EKG Measurement

Heart [id number]

ND [id number]

[id number]

[id number]

[id number]



<Conclusion>

Normal sinus rhythm with sinus arrhythmia

Normal ECG

## 2018-02-19 ENCOUNTER — HOSPITAL ENCOUNTER (EMERGENCY)
Dept: HOSPITAL 31 - C.ER | Age: 38
Discharge: HOME | End: 2018-02-19
Payer: COMMERCIAL

## 2018-02-19 VITALS — HEART RATE: 63 BPM | DIASTOLIC BLOOD PRESSURE: 74 MMHG | SYSTOLIC BLOOD PRESSURE: 115 MMHG | OXYGEN SATURATION: 100 %

## 2018-02-19 VITALS — RESPIRATION RATE: 16 BRPM

## 2018-02-19 VITALS — TEMPERATURE: 98.1 F

## 2018-02-19 DIAGNOSIS — N83.202: Primary | ICD-10-CM

## 2018-02-19 LAB
ALBUMIN SERPL-MCNC: 4.1 G/DL (ref 3.5–5)
ALBUMIN/GLOB SERPL: 1.2 {RATIO} (ref 1–2.1)
ALT SERPL-CCNC: 23 U/L (ref 9–52)
AST SERPL-CCNC: 19 U/L (ref 14–36)
BACTERIA #/AREA URNS HPF: (no result) /[HPF]
BASOPHILS # BLD AUTO: 0.1 K/UL (ref 0–0.2)
BASOPHILS NFR BLD: 0.9 % (ref 0–2)
BILIRUB UR-MCNC: NEGATIVE MG/DL
BUN SERPL-MCNC: 14 MG/DL (ref 7–17)
CALCIUM SERPL-MCNC: 9.6 MG/DL (ref 8.6–10.4)
EOSINOPHIL # BLD AUTO: 0.2 K/UL (ref 0–0.7)
EOSINOPHIL NFR BLD: 1.9 % (ref 0–4)
ERYTHROCYTE [DISTWIDTH] IN BLOOD BY AUTOMATED COUNT: 12.9 % (ref 11.5–14.5)
GFR NON-AFRICAN AMERICAN: > 60
GLUCOSE UR STRIP-MCNC: NORMAL MG/DL
HCG,QUALITATIVE URINE: NEGATIVE
HGB BLD-MCNC: 14 G/DL (ref 11–16)
HYALINE CASTS #/AREA URNS LPF: (no result) /LPF (ref 0–2)
LEUKOCYTE ESTERASE UR-ACNC: (no result) LEU/UL
LIPASE: 74 U/L (ref 23–300)
LYMPHOCYTES # BLD AUTO: 2.5 K/UL (ref 1–4.3)
LYMPHOCYTES NFR BLD AUTO: 28.1 % (ref 20–40)
MCH RBC QN AUTO: 31.1 PG (ref 27–31)
MCHC RBC AUTO-ENTMCNC: 34.5 G/DL (ref 33–37)
MCV RBC AUTO: 90.1 FL (ref 81–99)
MONOCYTES # BLD: 0.7 K/UL (ref 0–0.8)
MONOCYTES NFR BLD: 7.9 % (ref 0–10)
NEUTROPHILS # BLD: 5.4 K/UL (ref 1.8–7)
NEUTROPHILS NFR BLD AUTO: 61.2 % (ref 50–75)
NRBC BLD AUTO-RTO: 0 % (ref 0–2)
PH UR STRIP: 6 [PH] (ref 5–8)
PLATELET # BLD: 322 K/UL (ref 130–400)
PMV BLD AUTO: 8.6 FL (ref 7.2–11.7)
PROT UR STRIP-MCNC: (no result) MG/DL
RBC # BLD AUTO: 4.49 MIL/UL (ref 3.8–5.2)
RBC # UR STRIP: NEGATIVE /UL
SP GR UR STRIP: 1.01 (ref 1–1.03)
SQUAMOUS EPITHIAL: 11 /HPF (ref 0–5)
URINE NITRATE: NEGATIVE
UROBILINOGEN UR-MCNC: NORMAL MG/DL (ref 0.2–1)
WBC # BLD AUTO: 8.8 K/UL (ref 4.8–10.8)

## 2018-02-19 PROCEDURE — 96361 HYDRATE IV INFUSION ADD-ON: CPT

## 2018-02-19 PROCEDURE — 74177 CT ABD & PELVIS W/CONTRAST: CPT

## 2018-02-19 PROCEDURE — 84703 CHORIONIC GONADOTROPIN ASSAY: CPT

## 2018-02-19 PROCEDURE — 81001 URINALYSIS AUTO W/SCOPE: CPT

## 2018-02-19 PROCEDURE — 83690 ASSAY OF LIPASE: CPT

## 2018-02-19 PROCEDURE — 85025 COMPLETE CBC W/AUTO DIFF WBC: CPT

## 2018-02-19 PROCEDURE — 76856 US EXAM PELVIC COMPLETE: CPT

## 2018-02-19 PROCEDURE — 80053 COMPREHEN METABOLIC PANEL: CPT

## 2018-02-19 PROCEDURE — 76830 TRANSVAGINAL US NON-OB: CPT

## 2018-02-19 PROCEDURE — 99285 EMERGENCY DEPT VISIT HI MDM: CPT

## 2018-02-19 PROCEDURE — 96374 THER/PROPH/DIAG INJ IV PUSH: CPT

## 2018-02-19 NOTE — C.PDOC
History Of Present Illness


36yo female, with history of a hysterectomy, presents to ER with complaints of 

left lower quadrant abdominal pain for the past 3 days. Patient states the pain 

is sharp and present constantly. She has taken Tylenol and Advil, with last 

dose at 10PM with minimal relief. She also has a subjective fever but denies 

any chills, nausea, vomiting, diarrhea, constipation. She reports her last 

bowel movement was yesterday and was normal. She also denies any dysuria, 

hematuria. Patient denies any chest pain or shortness of breath as well. Of note

, patient reports this is the first instance of such pain. She has no other 

medical complaints. 


Time Seen by Provider: 02/19/18 11:12


Chief Complaint (Nursing): Abdominal Pain


History Per: Patient


History/Exam Limitations: no limitations


Onset/Duration Of Symptoms: Days (3), Persistent


Location Of Pain/Discomfort: LLQ


Quality Of Discomfort: Sharp, "Pain"


Associated Symptoms: Fever.  denies: Chills, Nausea, Vomiting, Diarrhea, Chest 

Pain, Urinary Symptoms


Abnormal Vaginal Bleeding: No





Past Medical History


Reviewed: Historical Data, Nursing Documentation, Vital Signs


Vital Signs: 


 Last Vital Signs











Temp  98.1 F   02/19/18 11:20


 


Pulse  78   02/19/18 14:38


 


Resp  16   02/19/18 14:38


 


BP  95/59 L  02/19/18 14:38


 


Pulse Ox  98   02/19/18 14:38














- Medical History


PMH: No Chronic Diseases


   Denies: Chronic Kidney Disease


Other Surgeries: Hysterectomy





- [facility] Procedures








CYSTOSCOPY NEC (09/10/14)


INSPECTION OF PELVIC CAVITY, PERC ENDO APPROACH (05/19/17)


OTH LYSIS-PERITONEAL ADHES (09/10/14)


OTH REMOVE BOTH OVARIES/TUBES (09/10/14)


OTHER AND UNSPECIFIED TOTAL ABDOMINAL HYSTERECTOMY (09/10/14)


REMOV URETERAL DRAIN (09/10/14)


RETROGRADE PYELOGRAM (09/10/14)


URETERAL CATHETERIZATION (09/10/14)








Family History: States: Unknown Family Hx


   Denies: MI





- Social History


Hx Tobacco Use: No


Hx Alcohol Use: No


Hx Substance Use: No





- Immunization History


Hx Tetanus Toxoid Vaccination: No


Hx Influenza Vaccination: No


Hx Pneumococcal Vaccination: No





Review Of Systems


Except As Marked, All Systems Reviewed And Found Negative.


Constitutional: Positive for: Fever (tactile).  Negative for: Chills


Cardiovascular: Negative for: Chest Pain


Respiratory: Negative for: Shortness of Breath


Gastrointestinal: Positive for: Abdominal Pain.  Negative for: Nausea, Vomiting

, Diarrhea, Constipation


Genitourinary: Negative for: Dysuria, Hematuria





Physical Exam





- Physical Exam


Appears: Non-toxic


Skin: Warm, Dry


Head: Atraumatic, Normacephalic


Eye(s): bilateral: Normal Inspection


Neck: Normal ROM, Supple


Chest: Symmetrical


Cardiovascular: Rhythm Regular


Respiratory: Normal Breath Sounds, No Wheezing


Gastrointestinal/Abdominal: Bowel Sounds, Soft, Tenderness (diffuse tenderness, 

more exquisitely present in left lower quadrant), No Guarding, Rebound


Back: Normal Inspection, No CVA Tenderness, No Vertebral Tenderness


Neurological/Psych: Oriented x3, Normal Speech





ED Course And Treatment





- Laboratory Results


Result Diagrams: 


 02/19/18 11:48





 02/19/18 11:48


O2 Sat by Pulse Oximetry: 98 (ra)


Pulse Ox Interpretation: Normal





- Physician Consult Information


Physician Contacted: Ashley A Joe


Outcome Of Conversation: Discussed patient with ob/gyn on call, recommends 

outpatient follow up with ob/gyn in 6-8 weeks.





Medical Decision Making


Medical Decision Making: 


Impression: Abdominal pain x 3 days


Plan:


-- Labs


-- Toradol 30mg IVP


-- IV Fluids








Disposition


Counseled Patient/Family Regarding: Studies Performed, Diagnosis, Need For 

Followup, Rx Given





- Disposition


Referrals: 


[facility] [Outside]


Disposition: HOME/ ROUTINE


Disposition Time: 18:10


Condition: STABLE


Additional Instructions: 


FOLLOW UP WITH OB/GYN WITHIN 1 WEEK





USE MEDICATIONS AS NEEDED FOR PAIN





RETURN TO ER IF SYMPTOMS WORSEN 


Prescriptions: 


Acetaminophen with Codeine [Tylenol with Codeine #3 Tablet] 1 each PO Q6 PRN #

12 tablet


 PRN Reason: pain 


Naproxen 375 mg PO BID PRN #20 tablet


 PRN Reason: pain


Instructions:  Ovarian Cyst (DC)


Forms:  [facility] (English)


Print Language: ENGLISH





- POA


Present On Arrival: None





- Clinical Impression


Clinical Impression: 


 Left ovarian cyst








- Scribe Statement


The provider has reviewed the documentation as recorded by the Scribe (Mary Alvarez)


Provider Attestation: 


All medical record entries made by the Scribe were at my direction and 

personally dictated by me. I have reviewed the chart and agree that the record 

accurately reflects my personal performance of the history, physical exam, 

medical decision making, and the department course for this patient. I have 

also personally directed, reviewed, and agree with the discharge instructions 

and disposition.

## 2018-02-19 NOTE — CT
PROCEDURE:  CT Abdomen and Pelvis with contrast



HISTORY:

llq pain, r/o diverticulitis



COMPARISON:

CT of the abdomen and pelvis without contrast performed 5/19/17



TECHNIQUE:

Contrast dose: 100 mL Visipaque



Radiation dose:



Total exam DLP = 308.43 mGy-cm.



This CT exam was performed using one or more of the following dose 

reduction techniques: Automated exposure control, adjustment of the 

mA and/or kV according to patient size, and/or use of iterative 

reconstruction technique.



FINDINGS:



LOWER THORAX:

No visible consolidation, pleural effusion, or pneumothorax.



LIVER:

Subtle indeterminate hypodense region adjacent to the falciform 

ligament, right hepatic lobe ; favored to reflect focal fatty 

infiltration. 



GALLBLADDER AND BILE DUCTS:

Unremarkable. 



PANCREAS:

Unremarkable.



SPLEEN:

Unremarkable. 



ADRENALS:

Unremarkable.  



KIDNEYS AND URETERS:

The kidneys enhance symmetrically. No hydronephrosis or obstructing 

calculus identified. 



VASCULATURE:

No aortic aneurysm. 



BOWEL:

Stomach is nondistended.  



Lack of oral contrast limits evaluation for bowel pathology.  Bowel 

loops appear within normal limits of caliber without evidence of 

obstruction. Small bowel wall thickening in the right upper quadrant 

; correlate for enteritis. 



APPENDIX:

The appendix appears within normal limits of caliber. No secondary 

signs of acute appendicitis.



PERITONEUM:

No significant free fluid. No definite free air. 



LYMPH NODES:

No bulky adenopathy identified. 



BLADDER:

Under distention of the urinary bladder limits evaluation. 



REPRODUCTIVE:

Uterus is absent consistent with hysterectomy. 5.5 x 4.3 cm left 

adnexal cystic lesion with evidence of septation. 



BONES:

No acute osseous abnormality is detected. 



OTHER FINDINGS:

None.



IMPRESSION:

5.5 x 4.3 cm left adnexal cystic lesion, indeterminate. This finding 

demonstrates evidence of a septation. Recommend pelvic ultrasound for 

further characterization. 



Thickened small bowel loops in the right upper quadrant ; correlate 

clinically for enteritis. 



Subtle indeterminate hypodense region adjacent to the falciform 

ligament, right hepatic lobe ; favored to reflect focal fatty 

infiltration. 



Hysterectomy.

## 2018-02-19 NOTE — US
Indication: left adnexal mass/cyst?  left sided pain



Comparison: CT abdomen and pelvis with IV contrast performed 2/19/18, 

pelvic ultrasound performed 7/14/17 



Technique: Real-time transabdominal pelvic ultrasound was performed. 

In addition a transvaginal pelvic ultrasound was necessary to better 

depict pelvic anatomy. 



Findings: 



The patient is status hysterectomy. 



The right ovary measures approximately 2.3 x 1.1 x 2.0 cm and 

contains 9 mm dominant follicle/cyst.



The left ovary measures approximately 5.9 x 4.7 x 5.6 cm.  Complex 

heterogeneous mostly hypodense left septated ovarian cystic lesion 

measures approximately 5.2 x 3.4 x 3.2 cm. 



Blood flow is demonstrated to both ovaries. 



No significant pelvic free fluid identified. 



Impression: 



Status post hysterectomy. 



5.2 cm complex heterogeneous mostly hypodense left ovarian cystic 

lesion containing mildly thickened septation which appears vascular.  

Recommend 6 week ultrasound follow-up to assess for complete 

resolution. If this finding does not resolve on 6 week follow-up, 

recommend follow-up MRI pelvis for further evaluation. 



9 mm right ovarian dominant follicle/cyst.

## 2018-04-03 ENCOUNTER — HOSPITAL ENCOUNTER (EMERGENCY)
Dept: HOSPITAL 31 - C.ER | Age: 38
Discharge: HOME | End: 2018-04-03
Payer: COMMERCIAL

## 2018-04-03 VITALS — OXYGEN SATURATION: 100 %

## 2018-04-03 VITALS — HEART RATE: 55 BPM | DIASTOLIC BLOOD PRESSURE: 80 MMHG | SYSTOLIC BLOOD PRESSURE: 124 MMHG | RESPIRATION RATE: 18 BRPM

## 2018-04-03 VITALS — TEMPERATURE: 97.9 F

## 2018-04-03 DIAGNOSIS — R10.32: Primary | ICD-10-CM

## 2018-04-03 LAB
ALBUMIN SERPL-MCNC: 4.5 G/DL (ref 3.5–5)
ALBUMIN/GLOB SERPL: 1.2 {RATIO} (ref 1–2.1)
ALT SERPL-CCNC: 14 U/L (ref 9–52)
AST SERPL-CCNC: 26 U/L (ref 14–36)
BACTERIA #/AREA URNS HPF: (no result) /[HPF]
BILIRUB UR-MCNC: NEGATIVE MG/DL
BUN SERPL-MCNC: 17 MG/DL (ref 7–17)
CALCIUM SERPL-MCNC: 9.5 MG/DL (ref 8.6–10.4)
ERYTHROCYTE [DISTWIDTH] IN BLOOD BY AUTOMATED COUNT: 12.8 % (ref 11.5–14.5)
GFR NON-AFRICAN AMERICAN: > 60
GLUCOSE UR STRIP-MCNC: NORMAL MG/DL
HGB BLD-MCNC: 14.5 G/DL (ref 11–16)
LEUKOCYTE ESTERASE UR-ACNC: (no result) LEU/UL
MCH RBC QN AUTO: 31.5 PG (ref 27–31)
MCHC RBC AUTO-ENTMCNC: 34.8 G/DL (ref 33–37)
MCV RBC AUTO: 90.4 FL (ref 81–99)
PH UR STRIP: 6 [PH] (ref 5–8)
PLATELET # BLD: 381 K/UL (ref 130–400)
PMV BLD AUTO: 8.2 FL (ref 7.2–11.7)
PROT UR STRIP-MCNC: NEGATIVE MG/DL
RBC # BLD AUTO: 4.62 MIL/UL (ref 3.8–5.2)
RBC # UR STRIP: NEGATIVE /UL
SP GR UR STRIP: 1.02 (ref 1–1.03)
SQUAMOUS EPITHIAL: 8 /HPF (ref 0–5)
UROBILINOGEN UR-MCNC: NORMAL MG/DL (ref 0.2–1)
WBC # BLD AUTO: 10 K/UL (ref 4.8–10.8)

## 2018-04-03 PROCEDURE — 80053 COMPREHEN METABOLIC PANEL: CPT

## 2018-04-03 PROCEDURE — 87491 CHLMYD TRACH DNA AMP PROBE: CPT

## 2018-04-03 PROCEDURE — 81001 URINALYSIS AUTO W/SCOPE: CPT

## 2018-04-03 PROCEDURE — 87591 N.GONORRHOEAE DNA AMP PROB: CPT

## 2018-04-03 PROCEDURE — 96375 TX/PRO/DX INJ NEW DRUG ADDON: CPT

## 2018-04-03 PROCEDURE — 99285 EMERGENCY DEPT VISIT HI MDM: CPT

## 2018-04-03 PROCEDURE — 96374 THER/PROPH/DIAG INJ IV PUSH: CPT

## 2018-04-03 PROCEDURE — 74177 CT ABD & PELVIS W/CONTRAST: CPT

## 2018-04-03 PROCEDURE — 85027 COMPLETE CBC AUTOMATED: CPT

## 2018-04-03 PROCEDURE — 76830 TRANSVAGINAL US NON-OB: CPT

## 2018-04-03 PROCEDURE — 83690 ASSAY OF LIPASE: CPT

## 2018-04-03 PROCEDURE — 76856 US EXAM PELVIC COMPLETE: CPT

## 2018-04-03 NOTE — CT
PROCEDURE:  CT Abdomen and Pelvis with contrast



HISTORY:

Stomach pain. 



Relevant surgical history: Hysterectomy 



COMPARISON:

02/19/2018. CT abdomen and pelvis



April 3, 2018.  Pelvic ultrasound.  



TECHNIQUE:

Contrast dose: 100 cc Visipaque 320.



Radiation dose:



Total exam DLP = 216.10 mGy-cm.



This CT exam was performed using one or more of the following dose 

reduction techniques: Automated exposure control, adjustment of the 

mA and/or kV according to patient size, and/or use of iterative 

reconstruction technique.



FINDINGS:



LOWER THORAX:

Unremarkable. 



LIVER:

Unremarkable. No gross lesion or ductal dilatation. 



GALLBLADDER AND BILE DUCTS:

Unremarkable. 



PANCREAS:

Unremarkable. No gross lesion or ductal dilatation.



SPLEEN:

Unremarkable. 



ADRENALS:

Unremarkable. No mass. 



KIDNEYS AND URETERS:

Unremarkable. No hydronephrosis. No solid mass. 



VASCULATURE:

Unremarkable. No aortic aneurysm. 



BOWEL:

The stomach is collapsed accentuating gastric wall thickness .  In 

the absence of oral contrast no additional information can be gleaned 

from this study. Gastritis therefore should be considered in the 

appropriate clinical setting.



APPENDIX:

Normal appendix. 



PERITONEUM:

Unremarkable. No free fluid. No free air. 



LYMPH NODES:

Unremarkable. No enlarged lymph nodes. 



BLADDER:

Unremarkable. 



REPRODUCTIVE:

Unremarkable. Cyst right hemipelvis better visualize, confirmed as 

right adnexal cyst measuring 2 cm. 



BONES:

No acute fracture. 



OTHER FINDINGS:

None.



IMPRESSION:

Diffuse gastric wall thickening likely related to absence of oral 

contrast, a decompressed stomach.



Additional benign and/or incidental findings described above.

## 2018-04-03 NOTE — C.PDOC
History Of Present Illness





<Tara Godinez E - Last Filed: 18 18:05>





<Miky Escoto - Last Filed: 18 12:35>


Patient is a 37 year old  with past medical history of fibroids, which she 

had a hysterectomy and salpingectomy for  in  who presents to the ED with 

complaints of diffuse abdominal/pelvic pain that is more localized to the left 

pelvic region. Patient was seen in the ED 18 for the same complaint and 

was instructed to follow up with OB/GYN outpatient for a complex left ovarian 

cyst. Patient presents today with similar presentation that started this 

morning. Patient denies nausea, vomiting, abnormal vaginal discharge, diarrhea, 

constipation, hematochezia, hematuria but admits to subjective fever and 

chills. 


 (Tara Godinez)


History Per: Patient


History/Exam Limitations: no limitations


Onset/Duration Of Symptoms: Hrs


Current Symptoms Are (Timing): Still Present


Severity: Moderate


Pain Scale Rating Of: 5


Location: Diffuse abdominal pain < left lower quadrant


Reports Recently: Seen In ED


Recent travel outside of the [place]: No


Additional History Per: Patient





<Tara Godinez E - Last Filed: 18 18:05>





<Miky Escoto - Last Filed: 18 12:35>


Time Seen by Provider: 18 14:31


Chief Complaint (Nursing): Abdominal Pain





Past Medical History





- Medical History


PMH: 


   Denies: Chronic Kidney Disease


Other Surgeries: Hysterectomy with salpingectomy ().  Diagnostic 

laparascopic to r/o ovarin torsion ()


Family History: States: Unknown Family Hx


   Denies: MI





- Social History


Hx Tobacco Use: No


Hx Alcohol Use: No


Hx Substance Use: No





- Immunization History


Hx Tetanus Toxoid Vaccination: No


Hx Influenza Vaccination: No


Hx Pneumococcal Vaccination: No





<Tara Godinez - Last Filed: 18 18:05>


Vital Signs: 





 Last Vital Signs











Temp  97.9 F   18 13:54


 


Pulse  55 L  18 17:06


 


Resp  18   18 17:06


 


BP  124/80   18 17:06


 


Pulse Ox  100   18 18:19














- [facility] Procedures











CYSTOSCOPY NEC (09/10/14)


INSPECTION OF PELVIC CAVITY, PERC ENDO APPROACH (17)


OTH LYSIS-PERITONEAL ADHES (09/10/14)


OTH REMOVE BOTH OVARIES/TUBES (09/10/14)


OTHER AND UNSPECIFIED TOTAL ABDOMINAL HYSTERECTOMY (09/10/14)


REMOV URETERAL DRAIN (09/10/14)


RETROGRADE PYELOGRAM (09/10/14)


URETERAL CATHETERIZATION (09/10/14)











Review Of Systems


Constitutional: Positive for: Fever, Chills.  Negative for: Weakness, Malaise


Eyes: Negative for: Pain, Vision Change


ENT: Negative for: Ear Pain, Ear Discharge


Cardiovascular: Negative for: Chest Pain, Palpitations


Respiratory: Negative for: Cough, Shortness of Breath, Hemoptysis


Gastrointestinal: Positive for: Abdominal Pain.  Negative for: Nausea, Vomiting

, Diarrhea, Constipation, Hematochezia, Hematemesis


Genitourinary: Positive for: Pelvic Pain.  Negative for: Dysuria, Frequency, 

Hematuria, Vaginal Discharge, Vaginal Bleeding


Neurological: Negative for: Weakness, Numbness, Confusion, Seizures, Dizziness





<Tara Godinez E - Last Filed: 18 18:05>





Physical Exam





- Physical Exam


Appears: No Acute Distress


Skin: Normal Color


Head: Atraumatic, Normacephalic


Eye(s): bilateral: Normal Inspection, EOMI


Cardiovascular: Rhythm Regular, No Murmur


Respiratory: Normal Breath Sounds, No Decreased Breath Sounds, No Accessory 

Muscle Use, No Rales, No Rhonchi


Gastrointestinal/Abdominal: Normal Exam, Bowel Sounds, Tenderness, Other


Pelvic: Other (Pelvic tenderness on palpation )


Extremity: Normal ROM, No Tenderness, No Pedal Edema, No Calf Tenderness, No 

Swelling


Extremity: Bilateral: Atraumatic


Neurological/Psych: Oriented x3, Normal Speech





<Tara Godinez E - Last Filed: 18 18:05>





ED Course And Treatment





- Laboratory Results


Result Diagrams: 


 18 14:38





 18 14:38


Lab Interpretation: Normal


O2 Sat by Pulse Oximetry: 100


Reevaluation Time: 18:00


Reassessment Condition: Improved





<Tara Godinez - Last Filed: 18 18:05>





- Laboratory Results


Result Diagrams: 


 18 14:38





 18 14:38





<Miky Escoto - Last Filed: 18 12:35>





Medical Decision Making





<Tara Godinez - Last Filed: 18 18:05>





<Miky Escoto - Last Filed: 18 12:35>


Medical Decision Making: 


Transvaginal and pelvis US: FINDINGS:UTERUS:Status post hysterectomy, 

ENDOMETRIUM:N/A, CERVIX: Hysterectomy, 


RIGHT OVARY: Measures 2.0 x 2.7 x 3.0 cm. No solid mass. 1.7 x 1.2 x 1.5 cm 

physiologic cyst.  LEFT OVARY: Measures 2.6 x 1.7 x 2.7 cm. No solid mass. 

Normal flow.  FREE FLUID: No significant free fluid noted. OTHER FINDINGS: 

None. IMPRESSION: Status post hysterectomy.  Otherwise unremarkable.





CT Abdomen and Pelvis with contrast: FINDINGS:LOWER THORAX:Unremarkable. LIVER:

Unremarkable. No gross lesion or ductal dilatation. GALLBLADDER AND BILE DUCTS: 

Unremarkable. PANCREAS: Unremarkable. No gross lesion or ductal dilatation. 

SPLEEN:Unremarkable. ADRENALS: Unremarkable. No mass. 


KIDNEYS AND URETERS: Unremarkable. No hydronephrosis. No solid mass. VASCULATURE

: Unremarkable. No aortic aneurysm. BOWEL:


The stomach is collapsed accentuating gastric wall thickness .  In the absence 

of oral contrast no additional information can be gleaned from this study. 

Gastritis therefore should be considered in the appropriate clinical setting. 

APPENDIX: Normal appendix. PERITONEUM:Unremarkable. No free fluid. No free air. 


LYMPH NODES: Unremarkable. No enlarged lymph nodes. BLADDER: Unremarkable. 

REPRODUCTIVE: Unremarkable. Cyst right hemipelvis better visualize, confirmed 

as right adnexal cyst measuring 2 cm. BONES: No acute fracture. OTHER FINDINGS:

None. IMPRESSION: Diffuse gastric wall thickening likely related to absence of 

oral contrast, a decompressed stomach.


Additional benign and/or incidental findings described above.


























 (Tara Godinez)


Patient with llq pain on exam, (+) bs, pain improved after medications, imaging 

ordered and no acute findings. Patient discharged home to follow up with 

medical clinic or pmd within 2 days 


 (Miky Escoto)





Disposition


Discussed With : Miky Escoto





- Disposition


Disposition Time: 18:12





<Tara Godinez - Last Filed: 18 18:05>





<Miky Escoto - Last Filed: 18 12:35>





- Disposition


Referrals: 


[facility] [Outside]


[facility] [Outside]


[facility] [Outside]


[facility] [Outside]


Disposition: HOME/ ROUTINE


Condition: GOOD


Additional Instructions: 


Please discharge patient home 


Please follow up with an Ob/Gyn at the clinic referred to you or recommended to 

you


Please follow up with [facility] to establish primary 

care, [phone]


Please return to the ED if pain worsens in two days. 


Forms:  [facility] (English), General Discharge Instructions





- Clinical Impression


Clinical Impression: 


 Abdominal pain, Abdominal pain

## 2018-04-03 NOTE — US
HISTORY:

Diffuse abdominal pain



COMPARISON:

None available.



TECHNIQUE:

Transabdominal and transvaginal



FINDINGS:



UTERUS:

Status post hysterectomy 



ENDOMETRIUM:

N/A



CERVIX:

Hysterectomy



RIGHT OVARY:

Measures 2.0 x 2.7 x 3.0 cm. No solid mass. 1.7 x 1.2 x 1.5 cm 

physiologic cyst. 



LEFT OVARY:

Measures 2.6 x 1.7 x 2.7 cm. No solid mass. Normal flow. 



FREE FLUID:

No significant free fluid noted.



OTHER FINDINGS:

None. 



IMPRESSION:

Status post hysterectomy.  Otherwise unremarkable.

## 2020-08-26 NOTE — C.PDOC
History Of Present Illness





A 36 year old female presents to the emergency room with complaints of left 

flank pain that started at noon today. There are no exacerbating or relieving 

factors. Patient denies any fever, hematuria, dysuria, vaginal bleeding, fever, 

chills, nausea, vomiting, or any other complaints. 


Time Seen by Provider: 05/19/17 21:41


Chief Complaint (Nursing): Female Genitourinary


History Per: Patient


History/Exam Limitations: no limitations


Onset/Duration Of Symptoms: Hrs


Current Symptoms Are (Timing): Still Present


Severity: Mild


Recent travel outside of the [place]: No





Past Medical History


Reviewed: Historical Data, Nursing Documentation, Vital Signs


Vital Signs: 


 Last Vital Signs











Temp  98.0 F   05/19/17 23:45


 


Pulse  81   05/19/17 23:45


 


Resp  16   05/19/17 23:45


 


BP  122/80   05/19/17 23:45


 


Pulse Ox  95   05/20/17 00:29














- Medical History


PMH: 


   Denies: Chronic Kidney Disease





- [facility] Procedures








CYSTOSCOPY NEC (09/10/14)


OTH LYSIS-PERITONEAL ADHES (09/10/14)


OTH REMOVE BOTH OVARIES/TUBES (09/10/14)


OTHER AND UNSPECIFIED TOTAL ABDOMINAL HYSTERECTOMY (09/10/14)


REMOV URETERAL DRAIN (09/10/14)


RETROGRADE PYELOGRAM (09/10/14)


URETERAL CATHETERIZATION (09/10/14)








Family History: 


   Denies: MI





- Social History


Hx Alcohol Use: No


Hx Substance Use: No





- Immunization History


Hx Tetanus Toxoid Vaccination: No


Hx Influenza Vaccination: No


Hx Pneumococcal Vaccination: No





Review Of Systems


Except As Marked, All Systems Reviewed And Found Negative.


Constitutional: Negative for: Fever, Chills


Gastrointestinal: Negative for: Nausea, Vomiting, Diarrhea


Genitourinary: Negative for: Dysuria, Frequency, Incontinence, Hematuria, 

Vaginal Bleeding


Musculoskeletal: Positive for: Back Pain (Left flank pain)





Physical Exam





- Physical Exam


Appears: Non-toxic


Skin: Warm, Dry, No Rash


Head: Atraumatic, Normacephalic


Eye(s): bilateral: Normal Inspection


Oral Mucosa: Moist


Cardiovascular: Rhythm Regular


Respiratory: Normal Breath Sounds, No Rales, No Rhonchi, No Wheezing


Gastrointestinal/Abdominal: Soft, No Tenderness, No Guarding, No Rebound


Back: Other (Left flank pain. No rebound. No guarding.)


Extremity: Normal ROM, No Tenderness


Neurological/Psych: Oriented x3, Normal Speech, Normal Cognition





ED Course And Treatment





- Laboratory Results


Result Diagrams: 


 05/19/17 21:54





 05/19/17 21:54


O2 Sat by Pulse Oximetry: 95 (Room air)


Pulse Ox Interpretation: Normal





- CT Scan/US


  ** Abd/pel w/o contrast


Other Rad Studies (CT/US): Read By Radiologist, Radiology Report Reviewed


CT/US Interpretation: IMPRESSION:  Moderate left hydronephrosis and hydroureter 

secondary to left adnexal mass which causes mass.  effect on the distal left 

ureter.  Differential diagnosis of left adnexal mass includes hemorrhagic cyst, 

endometrioma, ovarian torsion,.  presence of neoplasm cannot be excluded. 

Consider transvaginal sonographic correlation with.  Doppler.  No evidence for 

bowel herniation, bowel obstruction, colitis, appendicitis or diverticulitis.  

No gross ureteral stone or obstructive uropathy is visualized.





Medical Decision Making


Medical Decision Making: 


r/o renal colic, uti, pyelo


Plan:


-- Abdomen & Pelvis CT


-- Labs


-- Zofran & Morphine








1100: noted ct reading of ovarian lesion. us added. case discussed with dr sanz, high suspicion for torsion, US pending. pt returned from US, high 

clinical suspicion for torsion, dr sanz bedside, will take pt to or for 

exploration ekg nsr [address] no st twave changes





Disposition





- Disposition


Disposition: HOSPITALIZED


Disposition Time: 23:46


Condition: STABLE





- Clinical Impression


Clinical Impression: 


 Ovarian torsion








- Scribe Statement


The provider has reviewed the documentation as recorded by the Scribe





Andre Hernandez





All medical record entries made by the Scribe were at my direction and 

personally dictated by me. I have reviewed the chart and agree that the record 

accurately reflects my personal performance of the history, physical exam, 

medical decision making, and the department course for this patient. I have 

also personally directed, reviewed, and agree with the discharge instructions 

and disposition.





Decision To Admit





- Pt Status Changed To:


Hospital Disposition Of: Inpatient





- Admit Certification


Admit to Inpatient:: After my assessment, the patient will require 

hospitalization for at least two midnights.  This is because of the severity of 

symptoms shown, intensity of services needed, and/or the medical risk in this 

patient being treated as an outpatient.





- InPatient:


Physician Admission Certification: I certify that this patient requires 2 or 

more midnights of care for the following reason:: pt needs or for eval for 

torsion





- .


Bed Request Type: Regular


Admitting Physician: Pop Sanz


Patient Diagnosis: 


 Ovarian torsion
unknown

## 2023-07-13 ENCOUNTER — NEW PATIENT EMERGENCY (OUTPATIENT)
Dept: URBAN - METROPOLITAN AREA CLINIC 21 | Facility: CLINIC | Age: 43
End: 2023-07-13

## 2023-07-13 DIAGNOSIS — H44.20: ICD-10-CM

## 2023-07-13 DIAGNOSIS — H53.021: ICD-10-CM

## 2023-07-13 PROCEDURE — 92015 DETERMINE REFRACTIVE STATE: CPT

## 2023-07-13 PROCEDURE — 92004 COMPRE OPH EXAM NEW PT 1/>: CPT

## 2023-07-13 PROCEDURE — 92250 FUNDUS PHOTOGRAPHY W/I&R: CPT

## 2023-07-13 ASSESSMENT — TONOMETRY
OD_IOP_MMHG: 18
OS_IOP_MMHG: 18

## 2023-07-13 ASSESSMENT — VISUAL ACUITY
OD_CC: 20/60
OS_CC: 20/30+2